# Patient Record
Sex: MALE | Race: WHITE
[De-identification: names, ages, dates, MRNs, and addresses within clinical notes are randomized per-mention and may not be internally consistent; named-entity substitution may affect disease eponyms.]

---

## 2019-09-09 ENCOUNTER — HOSPITAL ENCOUNTER (EMERGENCY)
Dept: HOSPITAL 61 - ER | Age: 44
Discharge: HOME | End: 2019-09-09
Payer: MEDICARE

## 2019-09-09 VITALS — DIASTOLIC BLOOD PRESSURE: 85 MMHG | SYSTOLIC BLOOD PRESSURE: 137 MMHG

## 2019-09-09 VITALS — HEIGHT: 72 IN | BODY MASS INDEX: 31.97 KG/M2 | WEIGHT: 236 LBS

## 2019-09-09 DIAGNOSIS — L02.413: Primary | ICD-10-CM

## 2019-09-09 DIAGNOSIS — I10: ICD-10-CM

## 2019-09-09 DIAGNOSIS — Z88.8: ICD-10-CM

## 2019-09-09 PROCEDURE — 99283 EMERGENCY DEPT VISIT LOW MDM: CPT

## 2019-09-09 NOTE — PHYS DOC
Past Medical History


Past Medical History:  Anxiety, Depression, Hypertension, Other


Additional Past Medical Histor:  insomnia


Past Surgical History:  No Surgical History


Alcohol Use:  Occasionally


Drug Use:  None





Adult General


Chief Complaint


Chief Complaint:  ABSCESS





HPI


HPI





Patient is a 44  year old male who presents with gets frequent boils and 

abscesses. Patient has the start of a abscess on the right lower forearm just 

distal to the elbow. Patient states for the last couple of days he's noticed is 

draining purulent fluid. Patient states that he thinks is also been feeling 

chills but has not taken his temperature. Patient denies  pain.





Review of Systems


Review of Systems





Constitutional: Denies fever or chills []


Musculoskeletal: Denies back pain or joint pain []


Integument: abscess. Denies rash or skin lesions []


Neurologic: Denies headache, focal weakness or sensory changes []








All other systems were reviewed and found to be within normal limits, except as 

documented in this note.





Allergies


Allergies





Allergies








Coded Allergies Type Severity Reaction Last Updated Verified


 


  metoprolol Adverse Reaction Intermediate "MAKES ME HAVE HIGH BLOOD PRESSURE" 

9/9/19 Yes











Physical Exam


Physical Exam





Constitutional: Well developed, well nourished, no acute distress, non-toxic 

appearance. []


Skin: Nickel sized abscess to Lower forearm distal to elbow. Warm, dry, no 

erythema, no rash. [] 


Extremities: No tenderness, no cyanosis, no clubbing, ROM intact, no edema. [] 


Neurologic: Alert and oriented X 3, normal motor function, normal sensory 

function, no focal deficits noted. []


Psychologic: Affect normal, judgement normal, mood normal. []





Current Patient Data


Vital Signs





                                   Vital Signs








  Date Time  Temp Pulse Resp B/P (MAP) Pulse Ox O2 Delivery O2 Flow Rate FiO2


 


9/9/19 11:11 98.7 86 20 137/85 (102) 99 Room Air  





 98.7       











EKG


EKG


[]





Radiology/Procedures


Radiology/Procedures


[]





Course & Med Decision Making


Course & Med Decision Making


Patient is a 44  year old male who presents with gets frequent boils and 

abscesses. Patient has the start of a abscess on the right lower forearm just 

distal to the elbow. Patient states for the last couple of days he's noticed is 

draining purulent fluid. Patient states that he thinks is also been feeling 

chills but has not taken his temperature. Patient denies  pain. Patient has a 

nickel size pink in color slightly raised abscess that looks like it is scabbed 

over. Currently there is no drainage seen. There is no tenderness to palpation. 

There is no associated cellulitis. There is no swelling to the extremity. Radial

 pulses strong and present. Cap refill less than 3 seconds. Skin pink warm and 

dry. Patient is able to use the extremity appropriately. Range of motion in all 

joints. There is no heat to the area or red streaking. Patient is afebrile.





Dragon Disclaimer


Dragon Disclaimer


This electronic medical record was generated, in whole or in part, using a voice

 recognition dictation system.





Departure


Departure


Impression:  


   Primary Impression:  


   Abscess


Disposition:  01 HOME, SELF-CARE


Condition:  STABLE


Referrals:  


JABARI GALLAGHER MD (PCP)


Patient Instructions:  Abscess





Additional Instructions:  


Follow up with your doctor in the next 2 days. Take medications as prescribed.


Scripts


Cephalexin (KEFLEX) 500 Mg Capsule


1 CAP PO TID, #30 CAP


   Prov: DAGMAR BLISS         9/9/19











DAGMAR BLISS             Sep 9, 2019 11:41

## 2019-09-10 ENCOUNTER — HOSPITAL ENCOUNTER (EMERGENCY)
Dept: HOSPITAL 61 - ER | Age: 44
Discharge: HOME | End: 2019-09-10
Payer: MEDICARE

## 2019-09-10 VITALS — DIASTOLIC BLOOD PRESSURE: 94 MMHG | SYSTOLIC BLOOD PRESSURE: 134 MMHG

## 2019-09-10 VITALS — BODY MASS INDEX: 30.61 KG/M2 | WEIGHT: 226 LBS | HEIGHT: 72 IN

## 2019-09-10 DIAGNOSIS — M70.21: ICD-10-CM

## 2019-09-10 DIAGNOSIS — Z88.8: ICD-10-CM

## 2019-09-10 DIAGNOSIS — I10: ICD-10-CM

## 2019-09-10 DIAGNOSIS — Z88.1: ICD-10-CM
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
## 2019-09-10 DIAGNOSIS — T36.1X5A: ICD-10-CM

## 2019-09-10 DIAGNOSIS — J02.9: Primary | ICD-10-CM

## 2019-09-10 PROCEDURE — 99284 EMERGENCY DEPT VISIT MOD MDM: CPT

## 2019-09-10 PROCEDURE — 96375 TX/PRO/DX INJ NEW DRUG ADDON: CPT

## 2019-09-10 PROCEDURE — 96374 THER/PROPH/DIAG INJ IV PUSH: CPT

## 2019-09-10 NOTE — PHYS DOC
Past Medical History


Past Medical History:  Anxiety, Depression, Hypertension


Additional Past Medical Histor:  insomnia


Past Surgical History:  No Surgical History


Alcohol Use:  None


Drug Use:  None





Adult General


Chief Complaint


Chief Complaint:  ALLERGIC REACTION





HPI


HPI


44-year-old male presents with report of throat irritation/swelling after 

starting Keflex for concern for infection earlier today. Patient had been seen 

for elbow swelling and tenderness that was thought to be possibly secondary to 

infection. Denies fever or chills. Denies rash. Patient reports is able to 

handle his own secretions.





Review of Systems


Review of Systems





Constitutional: Denies fever or chills 


Eyes: Denies redness or eye pain 


HENT: Denies nasal congestion; reports sore throat and sensation of throat 

"closing"


Respiratory: Denies cough or shortness of breath 


Cardiovascular: Denies chest pain or palpitations


GI: Denies abdominal pain, nausea, or vomiting


: Denies dysuria or hematuria


Musculoskeletal: Denies back pain; reports right elbow swelling


Integument: Denies rash or skin lesions 


Neurologic: Denies headache, focal weakness or sensory changes





Complete systems were reviewed and found to be within normal limits, except as 

documented in this note.





Current Medications


Current Medications





Current Medications








 Medications


  (Trade)  Dose


 Ordered  Sig/Kassi  Start Time


 Stop Time Status Last Admin


Dose Admin


 


 Dexamethasone


 Sodium Phosphate


  (Decadron)  10 mg  1X  ONCE  9/10/19 02:00


 9/10/19 02:01 DC 9/10/19 02:12


10 MG


 


 Diphenhydramine


 HCl


  (Benadryl)  25 mg  1X  ONCE  9/10/19 02:00


 9/10/19 02:01 DC 9/10/19 02:12


25 MG


 


 Multi-Ingredient


 Mouthwash/Gargle


  (Gi Cocktail)  20 ml  1X  ONCE  9/10/19 02:00


 9/10/19 02:01 DC 9/10/19 02:12


20 ML











Allergies


Allergies





Allergies








Coded Allergies Type Severity Reaction Last Updated Verified


 


  cephalexin Allergy Mild  9/10/19 Yes


 


  metoprolol Adverse Reaction Intermediate "MAKES ME HAVE HIGH BLOOD PRESSURE" 

9/9/19 Yes











Physical Exam


Physical Exam





Constitutional: Well developed, well nourished, no acute distress, non-toxic 

appearance


HENT: Normocephalic, atraumatic, oropharynx moist, pharynx appears normal wit

hout any swelling, no stridor, handling secretions well


Eyes: Conjunctiva normal, no discharge


Neck: Normal range of motion, no tenderness, supple


Cardiovascular: Heart rate normal, regular rhythm


Lungs & Thorax:  Bilateral breath sounds clear to auscultation, no wheezing


Abdomen: Soft, no tenderness


Skin: Warm, dry, no erythema, no rash


Extremities: No tenderness, ROM intact, right olecranon bursitis noted without 

significant erythema, increased warmth noted


Neurologic: Alert and oriented X 3,  no focal deficits noted


Psychologic: Affect anxious, judgement normal





Current Patient Data


Vital Signs





                                   Vital Signs








  Date Time  Temp Pulse Resp B/P (MAP) Pulse Ox O2 Delivery O2 Flow Rate FiO2


 


9/10/19 02:13  85 14 134/94 (107) 98   


 


9/10/19 01:30 99.0     Room Air  





 99.0       











EKG


EKG


[]





Radiology/Procedures


Radiology/Procedures


[]





Course & Med Decision Making


Course & Med Decision Making


Patient presents with history of present illness and physical exam consistent 

for right olecranon bursitis for which patient had been previously prescribed 

some antibiotic. There was concern that antibiotic might have caused some type 

of allergic reaction. Patient's vital signs stable. No stridor or respiratory 

distress noted. Patient handled secretions well. Patient advised to discontinue 

antibiotic. Symptomatic treatment provided with oral steroid and Benadryl. Ace 

bandage provided.





Patient stable for discharge with outpatient follow-up with PCP/orthopedics. 

Discussed findings and plan with patient, who acknowledges understanding and 

agreement.





Dragon Disclaimer


Dragon Disclaimer


This electronic medical record was generated, in whole or in part, using a voice

 recognition dictation system.





Splinting


Splinting :  


   Location:  right elbow


   Pre-Made Type:  Ace bandage


   Pre-Proc Neuro Vasc Exam:  normal


   Post-Proc Neuro Vasc Exam:  normal, unchanged from pre-exam





Departure


Departure


Impression:  


   Primary Impression:  


   Allergic pharyngitis


   Additional Impression:  


   Olecranon bursitis of right elbow


Disposition:  01 HOME, SELF-CARE


Condition:  STABLE


Referrals:  


JABARI GALLAGHER MD (PCP)








GRACIE DIAZ MD


Patient Instructions:  Olecranon Bursitis, Easy-to-Read, Sore Throat, 

Easy-to-Read





Additional Instructions:  


Cannot fully exclude allergy.  Please take steroid as prescribed and Benadryl as

 needed for itching or rash.


Scripts


Diphenhydramine Hcl (BENADRYL) 25 Mg Capsule


1 CAP PO QID PRN for ALLERGIES, #20 CAP


   Prov: RERE HDZ DO         9/10/19 


Prednisone (PREDNISONE) 20 Mg Tablet


2 TAB PO DAILY, #8 TAB


   Start this prescription tomorrow, Wed 9/11/19


   Prov: RERE HDZ DO         9/10/19





Problem Qualifiers











RERE HDZ DO             Sep 10, 2019 02:02